# Patient Record
Sex: MALE | Race: BLACK OR AFRICAN AMERICAN | NOT HISPANIC OR LATINO | URBAN - METROPOLITAN AREA
[De-identification: names, ages, dates, MRNs, and addresses within clinical notes are randomized per-mention and may not be internally consistent; named-entity substitution may affect disease eponyms.]

---

## 2025-08-04 ENCOUNTER — APPOINTMENT (OUTPATIENT)
Dept: PEDIATRICS | Facility: CLINIC | Age: 25
End: 2025-08-04
Payer: COMMERCIAL

## 2025-08-04 VITALS — WEIGHT: 155 LBS | BODY MASS INDEX: 23.49 KG/M2 | HEIGHT: 68 IN | TEMPERATURE: 98 F

## 2025-08-04 DIAGNOSIS — R35.0 URINARY FREQUENCY: Primary | ICD-10-CM

## 2025-08-04 PROBLEM — I34.1 MITRAL VALVE PROLAPSE: Status: ACTIVE | Noted: 2024-11-18

## 2025-08-04 PROBLEM — I30.9 ACUTE PERICARDITIS: Status: RESOLVED | Noted: 2025-08-04 | Resolved: 2025-08-04

## 2025-08-04 PROBLEM — I31.9 PERICARDITIS (HHS-HCC): Status: RESOLVED | Noted: 2025-08-04 | Resolved: 2025-08-04

## 2025-08-04 LAB
POC APPEARANCE, URINE: CLEAR
POC BILIRUBIN, URINE: NEGATIVE
POC BLOOD, URINE: NEGATIVE
POC COLOR, URINE: YELLOW
POC GLUCOSE, URINE: NEGATIVE MG/DL
POC KETONES, URINE: NEGATIVE MG/DL
POC LEUKOCYTES, URINE: NEGATIVE
POC NITRITE,URINE: NEGATIVE
POC PH, URINE: 6 PH
POC PROTEIN, URINE: NEGATIVE MG/DL
POC SPECIFIC GRAVITY, URINE: <=1.005
POC UROBILINOGEN, URINE: 0.2 EU/DL

## 2025-08-04 PROCEDURE — 99203 OFFICE O/P NEW LOW 30 MIN: CPT | Performed by: PEDIATRICS

## 2025-08-04 PROCEDURE — 81003 URINALYSIS AUTO W/O SCOPE: CPT | Performed by: PEDIATRICS

## 2025-08-04 PROCEDURE — 3008F BODY MASS INDEX DOCD: CPT | Performed by: PEDIATRICS

## 2025-08-04 NOTE — PROGRESS NOTES
"Subjective   Patient ID: Erik Ashford is a 25 y.o. male who is here for concern of UTI.    HPI  Erik is here visiting family for the weekend; he typically resides in [place] now.  I had been his only pediatrician the first ~ 21 yrs of his life and agreed to see him while in town.  (His last routine physical was > 5 years ago, likely ~8 years ago).    He notes that 2 days ago he started noticing increased urinary frequency.  He denies dysuria, urgency, penile discharge, fever, abd pain, back pain, rash, Awakening at night to urinate, and polyphagia.  He admits that he did a lot of drinking alcohol over this past week, during his birthday celebrations.  He did have sex with a new female partner, and the condom broke.    Unrelated, Gonzalo notes that he was diagnosed with acute pericarditis Oct, 2024; placed on colchicine x 3 months, ibuprofen 600 mg TID x 10 days, heart monitor x 3 months; last follow-up 4/2025 - follow-up in 1 year, due ~ 4/2026.  I reviewed the ER and cardiology records and results.  Fortunately symptoms have not returned.  His cardiologist's interpretation of his most recent echo 4/2025 was \"There is no fluid around his heart (no pericardial effusion) or abnormalities suggestive of pericarditis on echo. There is abnormal motion of his mitral valve, but this is not causing any significant problem. His LV cavity is slightly larger than normal, but this is common in young athletes. Let's plan on repeat TTE in 1 year.\"    Objective   Temperature 36.7 °C (98 °F), height 1.727 m (5' 8\"), weight 70.3 kg (155 lb).  Physical Exam  Constitutional:       Appearance: Normal appearance. He is normal weight.   HENT:      Nose: Nose normal.      Mouth/Throat:      Mouth: Mucous membranes are moist.      Pharynx: Oropharynx is clear.     Cardiovascular:      Rate and Rhythm: Normal rate and regular rhythm.      Heart sounds: Normal heart sounds. No murmur heard.     No friction rub. No gallop.   Pulmonary:      " Effort: Pulmonary effort is normal.      Breath sounds: Normal breath sounds.   Abdominal:      General: Abdomen is flat. There is no distension.      Palpations: Abdomen is soft. There is no hepatomegaly, splenomegaly or mass.      Tenderness: There is no abdominal tenderness. There is no right CVA tenderness, left CVA tenderness or guarding.      Hernia: No hernia is present.   Genitourinary:     Penis: Normal and circumcised.       Testes: Normal.     Musculoskeletal:      Cervical back: Normal range of motion.   Lymphadenopathy:      Cervical: No cervical adenopathy.     Skin:     Findings: No rash.       Assessment/Plan   Problem List Items Addressed This Visit    None  Visit Diagnoses         Urinary frequency    -  Primary    Relevant Orders    POCT UA Automated manually resulted (Completed)    Urine Culture    C. trachomatis / N. gonorrhoeae, Amplified, Urogenital        - Urinary frequency with normal exam and normal urinalysis  Will send urine culture and PCR for CT & Gc and await results before treating other than symptomatic treatment  I will contact patient with results when available.  - I reinforced his need for cardiology follow-up and establishing with a PCP in [place]

## 2025-08-06 LAB
BACTERIA UR CULT: NORMAL
C TRACH RRNA SPEC QL NAA+PROBE: NOT DETECTED
N GONORRHOEA RRNA SPEC QL NAA+PROBE: NOT DETECTED
QUEST GC CT AMPLIFIED (ALWAYS MESSAGE): NORMAL

## 2025-08-08 ENCOUNTER — TELEPHONE (OUTPATIENT)
Dept: PEDIATRICS | Facility: CLINIC | Age: 25
End: 2025-08-08
Payer: COMMERCIAL

## 2025-08-08 DIAGNOSIS — N41.0 ACUTE PROSTATITIS: Primary | ICD-10-CM

## 2025-08-08 RX ORDER — LEVOFLOXACIN 500 MG/1
500 TABLET, FILM COATED ORAL DAILY
Qty: 14 TABLET | Refills: 1 | Status: SHIPPED | OUTPATIENT
Start: 2025-08-08

## 2025-08-09 NOTE — TELEPHONE ENCOUNTER
Late entry - patient reached out yesterday with ongoing symptoms of urinary frequency.  He notes that his semen is now yellow.  He remains systemically well.  In light of negative testing for CT & Gc as well as a UTI, I am suspicious that his symptoms are from acute prostatitis.  I am proceeding to treating for such with instructions to follow-up for care if worsening or new symptoms.  Remind to establish primary care in the location where he now lives.

## 2025-08-22 ENCOUNTER — TELEPHONE (OUTPATIENT)
Dept: PEDIATRICS | Facility: CLINIC | Age: 25
End: 2025-08-22
Payer: COMMERCIAL

## 2025-08-22 DIAGNOSIS — N41.0 ACUTE PROSTATITIS: Primary | ICD-10-CM

## 2025-08-22 RX ORDER — LEVOFLOXACIN 500 MG/1
500 TABLET, FILM COATED ORAL DAILY
Qty: 14 TABLET | Refills: 1 | Status: CANCELLED | OUTPATIENT
Start: 2025-08-22